# Patient Record
Sex: MALE | Race: ASIAN | NOT HISPANIC OR LATINO | URBAN - METROPOLITAN AREA
[De-identification: names, ages, dates, MRNs, and addresses within clinical notes are randomized per-mention and may not be internally consistent; named-entity substitution may affect disease eponyms.]

---

## 2020-09-05 ENCOUNTER — EMERGENCY (EMERGENCY)
Facility: HOSPITAL | Age: 25
LOS: 1 days | Discharge: HOME | End: 2020-09-05
Attending: EMERGENCY MEDICINE | Admitting: EMERGENCY MEDICINE
Payer: COMMERCIAL

## 2020-09-05 VITALS
SYSTOLIC BLOOD PRESSURE: 145 MMHG | DIASTOLIC BLOOD PRESSURE: 83 MMHG | HEART RATE: 74 BPM | OXYGEN SATURATION: 100 % | RESPIRATION RATE: 16 BRPM

## 2020-09-05 VITALS
DIASTOLIC BLOOD PRESSURE: 96 MMHG | HEART RATE: 63 BPM | SYSTOLIC BLOOD PRESSURE: 139 MMHG | RESPIRATION RATE: 63 BRPM | TEMPERATURE: 98 F | OXYGEN SATURATION: 100 %

## 2020-09-05 LAB
ALBUMIN SERPL ELPH-MCNC: 5.1 G/DL — HIGH (ref 3.3–5)
ALP SERPL-CCNC: 53 U/L — SIGNIFICANT CHANGE UP (ref 40–120)
ALT FLD-CCNC: 26 U/L — SIGNIFICANT CHANGE UP (ref 4–41)
ANION GAP SERPL CALC-SCNC: 17 MMO/L — HIGH (ref 7–14)
AST SERPL-CCNC: 19 U/L — SIGNIFICANT CHANGE UP (ref 4–40)
BASOPHILS # BLD AUTO: 0.05 K/UL — SIGNIFICANT CHANGE UP (ref 0–0.2)
BASOPHILS NFR BLD AUTO: 0.8 % — SIGNIFICANT CHANGE UP (ref 0–2)
BILIRUB SERPL-MCNC: 0.4 MG/DL — SIGNIFICANT CHANGE UP (ref 0.2–1.2)
BUN SERPL-MCNC: 8 MG/DL — SIGNIFICANT CHANGE UP (ref 7–23)
CALCIUM SERPL-MCNC: 10.3 MG/DL — SIGNIFICANT CHANGE UP (ref 8.4–10.5)
CHLORIDE SERPL-SCNC: 103 MMOL/L — SIGNIFICANT CHANGE UP (ref 98–107)
CO2 SERPL-SCNC: 24 MMOL/L — SIGNIFICANT CHANGE UP (ref 22–31)
CREAT SERPL-MCNC: 0.66 MG/DL — SIGNIFICANT CHANGE UP (ref 0.5–1.3)
EOSINOPHIL # BLD AUTO: 0.16 K/UL — SIGNIFICANT CHANGE UP (ref 0–0.5)
EOSINOPHIL NFR BLD AUTO: 2.7 % — SIGNIFICANT CHANGE UP (ref 0–6)
GLUCOSE SERPL-MCNC: 101 MG/DL — HIGH (ref 70–99)
HCT VFR BLD CALC: 48.5 % — SIGNIFICANT CHANGE UP (ref 39–50)
HGB BLD-MCNC: 16.1 G/DL — SIGNIFICANT CHANGE UP (ref 13–17)
IMM GRANULOCYTES NFR BLD AUTO: 0.3 % — SIGNIFICANT CHANGE UP (ref 0–1.5)
LYMPHOCYTES # BLD AUTO: 2.69 K/UL — SIGNIFICANT CHANGE UP (ref 1–3.3)
LYMPHOCYTES # BLD AUTO: 45.7 % — HIGH (ref 13–44)
MAGNESIUM SERPL-MCNC: 2.1 MG/DL — SIGNIFICANT CHANGE UP (ref 1.6–2.6)
MCHC RBC-ENTMCNC: 28 PG — SIGNIFICANT CHANGE UP (ref 27–34)
MCHC RBC-ENTMCNC: 33.2 % — SIGNIFICANT CHANGE UP (ref 32–36)
MCV RBC AUTO: 84.2 FL — SIGNIFICANT CHANGE UP (ref 80–100)
MONOCYTES # BLD AUTO: 0.44 K/UL — SIGNIFICANT CHANGE UP (ref 0–0.9)
MONOCYTES NFR BLD AUTO: 7.5 % — SIGNIFICANT CHANGE UP (ref 2–14)
NEUTROPHILS # BLD AUTO: 2.53 K/UL — SIGNIFICANT CHANGE UP (ref 1.8–7.4)
NEUTROPHILS NFR BLD AUTO: 43 % — SIGNIFICANT CHANGE UP (ref 43–77)
NRBC # FLD: 0 K/UL — SIGNIFICANT CHANGE UP (ref 0–0)
PHOSPHATE SERPL-MCNC: 3.8 MG/DL — SIGNIFICANT CHANGE UP (ref 2.5–4.5)
PLATELET # BLD AUTO: 174 K/UL — SIGNIFICANT CHANGE UP (ref 150–400)
PMV BLD: 9.2 FL — SIGNIFICANT CHANGE UP (ref 7–13)
POTASSIUM SERPL-MCNC: 4.3 MMOL/L — SIGNIFICANT CHANGE UP (ref 3.5–5.3)
POTASSIUM SERPL-SCNC: 4.3 MMOL/L — SIGNIFICANT CHANGE UP (ref 3.5–5.3)
PROT SERPL-MCNC: 7.8 G/DL — SIGNIFICANT CHANGE UP (ref 6–8.3)
RBC # BLD: 5.76 M/UL — SIGNIFICANT CHANGE UP (ref 4.2–5.8)
RBC # FLD: 13.1 % — SIGNIFICANT CHANGE UP (ref 10.3–14.5)
SODIUM SERPL-SCNC: 144 MMOL/L — SIGNIFICANT CHANGE UP (ref 135–145)
TSH SERPL-MCNC: 5.5 UIU/ML — HIGH (ref 0.27–4.2)
WBC # BLD: 5.89 K/UL — SIGNIFICANT CHANGE UP (ref 3.8–10.5)
WBC # FLD AUTO: 5.89 K/UL — SIGNIFICANT CHANGE UP (ref 3.8–10.5)

## 2020-09-05 PROCEDURE — 71046 X-RAY EXAM CHEST 2 VIEWS: CPT | Mod: 26

## 2020-09-05 PROCEDURE — 99284 EMERGENCY DEPT VISIT MOD MDM: CPT | Mod: 25

## 2020-09-05 PROCEDURE — 93010 ELECTROCARDIOGRAM REPORT: CPT

## 2020-09-05 RX ORDER — FAMOTIDINE 10 MG/ML
20 INJECTION INTRAVENOUS ONCE
Refills: 0 | Status: COMPLETED | OUTPATIENT
Start: 2020-09-05 | End: 2020-09-05

## 2020-09-05 RX ADMIN — FAMOTIDINE 20 MILLIGRAM(S): 10 INJECTION INTRAVENOUS at 03:24

## 2020-09-05 RX ADMIN — Medication 30 MILLILITER(S): at 03:23

## 2020-09-05 NOTE — ED PROVIDER NOTE - OBJECTIVE STATEMENT
Manny Carter MD: 25 yo M PMH p/w palpitation x 2 weeks. Pt states that he feels like something is moving in his chest. Pt also states that when he lays down he can hear his pulse. Pt also report numbness in his legs b/l. Pt reports that he feels nerves and that his symptoms started after he heard his grandfather had a heart attack 2 weeks ago. pt also report difficulty falling asleep but reports getting 7 hrs of sleep a night. Pt states that he went to urgent care yesterday and had labs and cxr done which where normal.

## 2020-09-05 NOTE — ED PROVIDER NOTE - NSFOLLOWUPINSTRUCTIONS_ED_ALL_ED_FT
1) Please follow-up with your primary care doctor in the next 2-3 days.  Please call tomorrow for an appointment.  If you cannot follow-up with your primary care doctor please return to the ED for any urgent issues. Your Thyroid stimulating hormone was elevated Please follow up with your PCP for further testing  2) You were given a copy of the tests performed today.  Please bring the results with you and review them with your primary care doctor.  3) If you have any worsening of symptoms or any other concerns please return to the ED immediately. Such as but not limited to including chest pain, shortness or breath, or weakness.  4) Please continue taking your home medications as directed.

## 2020-09-05 NOTE — ED ADULT NURSE NOTE - OBJECTIVE STATEMENT
Break Coverage RN: Received pt in room 7, ambulatory, pt A&Ox4, respirations even and unlabored b/l. Pt c/o "sensation that something is going down my chest", also c/o palpitations, slight SOB, head throbbing/numbness, tingling in b/l legs for 2 weeks. Denies pmhx. Abdomen soft, nondistended, nontender. Appears in no apparent distress. Sinus rhythm on cardiac monitor. Awaiting MD estrada. Will continue to monitor.

## 2020-09-05 NOTE — ED PROVIDER NOTE - ATTENDING CONTRIBUTION TO CARE
25yo M with no smoking, drug use, or PMHX, p/w 2 weeks of palpitations, feeling anxious, sensation there is something inside chest moving around, pulsations in head, numbness intermittently in legs and feet.  Says symptoms are often worse at night and began after grandfather had heart attack and he has been worried and thinking a lot lately    General: Patient in no apparent distress, AAO x 3  Skin: Dry and intact  HEENT: Head atraumatic. Oral mucosa moist. No pharyngeal exudates or tonsillar enlargement  Eyes: Conjunctiva normal  Cardiac: Regular rhythm and rate. No pretibial edema b/l  Respiratory: Lungs clear b/l and symmetric. No respiratory distress. Able to speak in complete sentences.  Gastrointestinal: Abdomen soft, nondistended, nontender  Musculoskeletal: Moves all extremities spontaneously  Neurological: alert and oriented to person, place, and time  Psychiatric: Cooperative, anxious appearing    EKG nsr on acute ischemic changes    a/p  multiple symptoms  anxious vs thyroid dz, perhaps gerd/pud as chest symptoms occur mostly at night  labs with tsh, cxr, gi meds

## 2020-09-05 NOTE — ED ADULT TRIAGE NOTE - CHIEF COMPLAINT QUOTE
pt c/o palpitations x 2 weeks. also endorses headache and 2 days of numbness to legs. nofevers, chills, or cough. denies medical hx

## 2020-09-05 NOTE — ED PROVIDER NOTE - PROGRESS NOTE DETAILS
Manny Carter MD: TSH mild elevated. Inform pt and he will f/u with pcp. Pt well appearing and asymptomatic. Pt is ambulatory and tolerating PO. Spoke with pt about return precautions. Pt agrees to follow up with their PCP. Pt ready for discharge

## 2020-09-05 NOTE — ED ADULT NURSE NOTE - NSIMPLEMENTINTERV_GEN_ALL_ED
Implemented All Universal Safety Interventions:  [place] to call system. Call bell, personal items and telephone within reach. Instruct patient to call for assistance. Room bathroom lighting operational. Non-slip footwear when patient is off stretcher. Physically safe environment: no spills, clutter or unnecessary equipment. Stretcher in lowest position, wheels locked, appropriate side rails in place.

## 2020-09-05 NOTE — ED PROVIDER NOTE - PHYSICAL EXAMINATION
Gen: AAOx3, non-toxic  Head: NCAT  HEENT: EOMI, PERRLA, oral mucosa moist, normal conjunctiva  Lung: CTAB, no respiratory distress, no wheezes/rhonchi/rales B/L, speaking in full sentences  CV: RRR, no murmurs, rubs or gallops  Abd: soft, NTND, no guarding, no CVA tenderness, no rebound tenderness  MSK: no visible deformities, full range of motion of all 4 exts  Neuro: No focal sensory or motor deficits  Skin: Warm, well perfused, no rash  Psych: normal affect.   ~Manny Carter MD Gen: AAOx3, non-toxic  Head: NCAT  HEENT: EOMI, PERRLA, oral mucosa moist, normal conjunctiva  Lung: CTAB, no respiratory distress, no wheezes/rhonchi/rales B/L, speaking in full sentences  CV: RRR, no murmurs, rubs or gallops  Abd: soft, NTND, no guarding, no CVA tenderness, no rebound tenderness  MSK: no visible deformities, full range of motion of all 4 exts  Neuro: No focal sensory or motor deficits  Skin: Warm, well perfused, no rash  Psych: anxious.   ~Manny Carter MD

## 2020-09-05 NOTE — ED PROVIDER NOTE - NS ED ROS FT
GENERAL: No fever or chills, EYES: no change in vision, HEENT: no trouble speaking, CARDIAC: no chest pain, + palpitation PULMONARY: no cough or + SOB, GI: no abdominal pain, no nausea, no vomiting, no diarrhea or constipation, : No changes in urination, SKIN: no rashes, NEURO: +numbness, no headache,  MSK: No muscle pain ~Manny Carter MD

## 2020-09-05 NOTE — ED PROVIDER NOTE - CLINICAL SUMMARY MEDICAL DECISION MAKING FREE TEXT BOX
anxiety Manny Carter MD: 23 yo M PMH p/w palpitation x 2 weeks. Pt states that he feels like something is moving in his chest. DDX. anxiety/panic disorder vs GERD will get basic labs, tsh ekg, cxr

## 2020-09-05 NOTE — ED PROVIDER NOTE - PATIENT PORTAL LINK FT
You can access the FollowMyHealth Patient Portal offered by [facility] by registering at the following website: [email or web address]. By joining [employer]’s FollowMyHealth portal, you will also be able to view your health information using other applications (apps) compatible with our system.

## 2020-09-25 PROBLEM — Z00.00 ENCOUNTER FOR PREVENTIVE HEALTH EXAMINATION: Status: ACTIVE | Noted: 2020-09-25

## 2020-09-26 ENCOUNTER — EMERGENCY (EMERGENCY)
Facility: HOSPITAL | Age: 25
LOS: 1 days | Discharge: HOME | End: 2020-09-26
Attending: STUDENT IN AN ORGANIZED HEALTH CARE EDUCATION/TRAINING PROGRAM | Admitting: STUDENT IN AN ORGANIZED HEALTH CARE EDUCATION/TRAINING PROGRAM
Payer: COMMERCIAL

## 2020-09-26 VITALS
SYSTOLIC BLOOD PRESSURE: 133 MMHG | OXYGEN SATURATION: 100 % | RESPIRATION RATE: 16 BRPM | HEART RATE: 78 BPM | DIASTOLIC BLOOD PRESSURE: 88 MMHG | TEMPERATURE: 98 F

## 2020-09-26 PROCEDURE — 99282 EMERGENCY DEPT VISIT SF MDM: CPT

## 2020-09-26 RX ORDER — IBUPROFEN 200 MG
400 TABLET ORAL ONCE
Refills: 0 | Status: COMPLETED | OUTPATIENT
Start: 2020-09-26 | End: 2020-09-26

## 2020-09-26 RX ADMIN — Medication 400 MILLIGRAM(S): at 15:58

## 2020-09-26 NOTE — ED PROVIDER NOTE - PMH
No pertinent past medical history   <<----- Click to add NO pertinent Past Medical History Deviated septum

## 2020-09-26 NOTE — ED ADULT TRIAGE NOTE - CHIEF COMPLAINT QUOTE
pain to nose radiating to head ,r eye x 4 days.saw eye md yesterday with negative eye exam. pt reports symptoms become worse at night. denies fever, cough

## 2020-09-26 NOTE — ED PROVIDER NOTE - CLINICAL SUMMARY MEDICAL DECISION MAKING FREE TEXT BOX
25 M no PMH p/w 4 days of frontal headache and eye pain. worst with laying flat. He has taken flonase and cetrizine w/o much improvement. He states he has hx of allergies and sinus issues for which he was recommended having a procedure few year before. he denies syncope, numbness, weakness, abdominal pain. he has normal neuro exam. + tenderness over b/l frontal sinuses.   Pt recommended to take flonase BID for 7 days along with motrin 600mg q 8 hor 5 days. Follow up with ent for further evaluation 25 M no PMH p/w 4 days of frontal headache and eye pain. worst with laying flat. He has taken flonase and cetrizine w/o much improvement. He states he has hx of allergies and sinus issues for which he was recommended having a procedure few year before. he denies syncope, numbness, weakness, abdominal pain. he has normal neuro exam. + tenderness over b/l frontal sinuses.   Pt recommended to take flonase two sprays per nostril BID for 7 days along with motrin 600mg q 8 hor 5 days.  Nasal saline flushes. Follow up with ent for further evaluation

## 2020-09-26 NOTE — ED PROVIDER NOTE - PHYSICAL EXAMINATION
CONSTITUTIONAL: well-appearing, in NAD  SKIN: Warm dry, normal skin turgor  HEAD: NCAT  EYES: EOMI, PERRLA, no scleral icterus, conjunctiva pink  ENT: normal pharynx with no erythema or exudates. Appreciable nasal septal deviation to right, no visible erythema, edema, no nasal discharge/bleeding.    NECK: Supple; non tender. Full ROM. No lymphadenopathy.  CARD: RRR, no murmurs.  RESP: clear to ausculation b/l. No crackles or wheezing.  ABD: soft, non-tender, non-distended, no rebound or guarding.  NEURO: normal motor. normal sensory. CN III-XII intact.   PSYCH: Cooperative, anxious, appropriate.

## 2020-09-26 NOTE — ED PROVIDER NOTE - NSFOLLOWUPCLINICS_GEN_ALL_ED_FT
An ENT Doctor  Otolaryngology (ENT)  .  [place]   Phone:   Fax:   Follow Up Time:     [facility] - ENT  Otolaryngology (ENT)  [address] [place] [address]  Phone: [phone]  Fax:   Follow Up Time:

## 2020-09-26 NOTE — ED PROVIDER NOTE - OBJECTIVE STATEMENT
25M no PMH presents to ED for 1 week worsening pressure pain at bridge of nose with radiation to bilateral forehead, pain on face under L eye, as well as difficulty breathing through his nose at night. Pt states that symptoms are worse at night and with lying down, get better with ibuprofen. Pt states he came to ED because he is having trouble sleeping at night with his pain, and feels like he cannot focus at work as a result. Denies prior hx of symptoms, denies fever, chills, denies nasal discharge/bleeding, denies cough, sore throat, denies dizziness, weakness, lightheadedness, double vision. Pt endorses occasional blurred vision but thinks its related to R eye tearing. Pt presented to ED three weeks ago for chest palpitations and was discharged home, pt says current sxs are unrelated

## 2020-09-26 NOTE — ED PROVIDER NOTE - NSFOLLOWUPINSTRUCTIONS_ED_ALL_ED_FT
You were seen in the Emergency Department for headache and nose pain, likely secondary to your chronic septum issue.    Please follow up with an ENT within 7 days. Contact information for our ENT service has been provided below.    Follow up with your primary care physician within 7 days.    For symptomatic relief, please continue to take your allergy medication as follows:  -Flonase, 2 sprays/nostril, twice a day. Please see medication label for use instructions and warnings.  -Continue to take your Zyrtec once/day. Please see medication label for use instructions and warnings.  -For pain, please take 600mg Motrin or Ibuprofen three times/day. Please see medication label for use instructions and warnings.  -Use saline nasal spray as needed for discomfort. Please see medication label for use instructions and warnings.    If your pain worsens, if you develop fever, chills, nasal discharge, cough, shortness of breath, or develop any other symptoms, or if your symptoms last longer than 10 days, return to the Emergency Department.

## 2020-10-12 ENCOUNTER — TRANSCRIPTION ENCOUNTER (OUTPATIENT)
Age: 25
End: 2020-10-12

## 2020-10-13 ENCOUNTER — APPOINTMENT (OUTPATIENT)
Dept: OTOLARYNGOLOGY | Facility: CLINIC | Age: 25
End: 2020-10-13

## 2020-10-19 PROBLEM — J34.2 DEVIATED NASAL SEPTUM: Chronic | Status: ACTIVE | Noted: 2020-09-26

## 2020-11-12 ENCOUNTER — APPOINTMENT (OUTPATIENT)
Dept: OTOLARYNGOLOGY | Facility: CLINIC | Age: 25
End: 2020-11-12
Payer: COMMERCIAL

## 2020-11-12 VITALS
HEIGHT: 69 IN | BODY MASS INDEX: 26.66 KG/M2 | TEMPERATURE: 98 F | SYSTOLIC BLOOD PRESSURE: 134 MMHG | WEIGHT: 180 LBS | HEART RATE: 87 BPM | DIASTOLIC BLOOD PRESSURE: 85 MMHG

## 2020-11-12 PROCEDURE — 31231 NASAL ENDOSCOPY DX: CPT

## 2020-11-12 PROCEDURE — 99204 OFFICE O/P NEW MOD 45 MIN: CPT | Mod: 25

## 2020-11-12 PROCEDURE — 99072 ADDL SUPL MATRL&STAF TM PHE: CPT

## 2020-11-12 PROCEDURE — 95004 PERQ TESTS W/ALRGNC XTRCS: CPT

## 2020-11-12 RX ORDER — CETIRIZINE HYDROCHLORIDE 10 MG/1
10 TABLET, FILM COATED ORAL DAILY
Qty: 90 | Refills: 2 | Status: ACTIVE | COMMUNITY
Start: 2020-11-12 | End: 1900-01-01

## 2020-11-12 RX ORDER — AZELASTINE HYDROCHLORIDE 137 UG/1
0.1 SPRAY, METERED NASAL TWICE DAILY
Qty: 1 | Refills: 3 | Status: ACTIVE | COMMUNITY
Start: 2020-11-12 | End: 1900-01-01

## 2020-11-12 NOTE — ASSESSMENT
[FreeTextEntry1] : Pt with nasal congestion and sinus pressure with severe left septal deviation and bilateral turbinate hypertrophy. Will start regiment to see if may improve symptoms and escalate if needed \par - Will start Flonase. A topical steroid reduce mucosal swelling, illustrated appropriate use and how to reduce the risk of bleeding \par - Nasal irrigation and showed how to use it to maximize effectiveness \par - started on azelastine \par - will also send in some Zyrtec \par - Allergy test performed. Counseled patient at length on pathophysiology all allergies and techniques for avoidance. handouts given.Mold, weed and tree\par - can consider septoplasty with sxs don’t improve, pt would like to try medical management first. \par \par \par Pt also with throat tightness on exam found to have mild acid reflux\par LPRD\par will proceed to start lifestyle regiment to reduce overproduction of acid and reduce laryngeal reflux including avoiding caffein, alcohol, eating before bed, spicy and fatty foods, and head elevation at night etc. Handout detailing regiment also given\par

## 2020-11-12 NOTE — PHYSICAL EXAM
[Midline] : trachea located in midline position [Normal] : no rashes [de-identified] : +poor tip support

## 2020-11-12 NOTE — PROCEDURE
[FreeTextEntry6] : Procedure performed: Nasal Endoscopy- Diagnostic\par Pre-op indication(s): nasal congestion\par Post-op indication(s): nasal congestion \par Verbal and/or written consent obtained from patient\par Anterior rhinoscopy insufficient to account for symptoms\par Scope #: 3,  flexible fiber optic telescope \par The scope was introduced in the nasal passage between the middle and inferior turbinates to exam the inferior portion of the middle meatus and the fontanelle, as well as the maxillary ostia.  Next, the scope was passed medically and posteriorly to the middle turbinates to examine the sphenoethmoid recess and the superior turbinate region.\par Upon visualization the finders are as follows:\par Nasal Septum: left septal deviation\par Bilateral - Mucosa: boggy turbinates, Mucous: scant, Polyp: not seen, Inferior Turbinate: boggy, Middle Turbinate: b/l BITH, Superior Turbinate: normal, Inferior Meatus: narrow, Middle Meatus: narrow, Super Meatus:normal, Sphenoethmoidal Recess: clear\par  [de-identified] : Procedure performed: laryngeal Endoscopy- Diagnostic\par Pre-op/post op indication: throat tightness \par Verbal and/or written consent obtained from patient, Patient was unable to cooperate with mirror\par Scope #: 3, flexible fiber optic telescope used \par Scope was introduced through the nose passed on the floor of the nose to the nasopharynx and then followed down the soft palate to the lower pharynx. The tongue Base, Larynx, Hypopharynx were examined. Base of tongue was symmetric, vallecular was clear, epiglottis was not deformed, subglottis/ pyriform and posterior pharyngeal walls were clear. +mild acid reflux, +erythema, edema, pooling of secretions, masses or lesions. Airway patent, no foreign body visualized. No glottic/supraglottic edema. True vocal cords, arytenoids, vestibular folds, ventricles, pyriform sinuses, and aryepiglottic folds appear normal bilaterally. Vocal cords mobile with good contact b/l.\par \par

## 2020-11-12 NOTE — END OF VISIT
[FreeTextEntry3] : I personally saw and examined BLU ARREDONDO in detail. I spoke to TRAVIS Ratliff regarding the assessment and plan of care.  I preformed the procedures and I reviewed the above assessment and plan of care, and agree. I have made changes in changes in the body of the note where appropriate.\par \par

## 2020-11-12 NOTE — HISTORY OF PRESENT ILLNESS
[de-identified] : Pt with hx of nasal trauma. Pt c/o throat tightness, feels its very hard to sleep at night that started 2 months ago. Feels its better than before. \par worse at night when hes laying down, unable to sleep supine position \par sleeping side will make it better \par will breathe through his mouth since he cant breathe through his nose, which also makes the sxs worse\par +intermittent dry cough for three years \par Pt denies heartburn, dysphagia, hoarseness, not a smoker or drinker\par Previous ENT stated he has a DNS, told him to wait a few months could be due to stress or anxiety. 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

## 2020-12-10 ENCOUNTER — APPOINTMENT (OUTPATIENT)
Dept: OTOLARYNGOLOGY | Facility: CLINIC | Age: 25
End: 2020-12-10

## 2021-01-05 RX ORDER — AMOXICILLIN AND CLAVULANATE POTASSIUM 875; 125 MG/1; MG/1
875-125 TABLET, COATED ORAL
Qty: 20 | Refills: 0 | Status: ACTIVE | COMMUNITY
Start: 2021-01-05 | End: 1900-01-01

## 2021-01-05 RX ORDER — PREDNISONE 10 MG/1
10 TABLET ORAL
Qty: 27 | Refills: 0 | Status: ACTIVE | COMMUNITY
Start: 2021-01-05 | End: 1900-01-01

## 2021-01-14 ENCOUNTER — APPOINTMENT (OUTPATIENT)
Dept: OTOLARYNGOLOGY | Facility: CLINIC | Age: 26
End: 2021-01-14
Payer: COMMERCIAL

## 2021-01-14 VITALS
DIASTOLIC BLOOD PRESSURE: 84 MMHG | SYSTOLIC BLOOD PRESSURE: 134 MMHG | BODY MASS INDEX: 26.66 KG/M2 | WEIGHT: 180 LBS | TEMPERATURE: 98.9 F | HEIGHT: 69 IN | HEART RATE: 101 BPM

## 2021-01-14 DIAGNOSIS — J34.89 OTHER SPECIFIED DISORDERS OF NOSE AND NASAL SINUSES: ICD-10-CM

## 2021-01-14 DIAGNOSIS — R68.89 OTHER GENERAL SYMPTOMS AND SIGNS: ICD-10-CM

## 2021-01-14 DIAGNOSIS — J30.2 OTHER SEASONAL ALLERGIC RHINITIS: ICD-10-CM

## 2021-01-14 DIAGNOSIS — K21.9 GASTRO-ESOPHAGEAL REFLUX DISEASE W/OUT ESOPHAGITIS: ICD-10-CM

## 2021-01-14 DIAGNOSIS — R09.81 NASAL CONGESTION: ICD-10-CM

## 2021-01-14 PROCEDURE — 99072 ADDL SUPL MATRL&STAF TM PHE: CPT

## 2021-01-14 PROCEDURE — 31231 NASAL ENDOSCOPY DX: CPT

## 2021-01-14 PROCEDURE — 99214 OFFICE O/P EST MOD 30 MIN: CPT | Mod: 25

## 2021-01-14 NOTE — END OF VISIT
[FreeTextEntry3] : I personally saw and examined BLU ARREDONDO in detail. I spoke to TRAVIS Ratliff regarding the assessment and plan of care.  I preformed the procedures and I reviewed the above assessment and plan of care, and agree. I have made changes in changes in the body of the note where appropriate.\par \par \par \par

## 2021-01-14 NOTE — HISTORY OF PRESENT ILLNESS
[de-identified] : Pt with hx of nasal trauma. Pt c/o throat tightness, feels its very hard to sleep at night that started 2 months ago. Feels its better than before. \par worse at night when hes laying down, unable to sleep supine position \par sleeping side will make it better \par will breathe through his mouth since he cant breathe through his nose, which also makes the sxs worse\par +intermittent dry cough for three years \par Pt denies heartburn, dysphagia, hoarseness, not a smoker or drinker\par Previous ENT stated he has a DNS, told him to wait a few months could be due to stress or anxiety. 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 \par was given abx and steriods and now states there was some improvement but not fully resolved\par currently taking flonase, and doing the saline washes  \par full optho workup per pt- WNL\par nuero- WNL \par \par pt also with globus sensation last visit had acid reflux on exam, doing the precautions but with no relief.\par also feels this tightness at the back of his tongue. \par +mild dysphagia\par pt denies changes in voice, no difficulty talking or breathing

## 2021-01-14 NOTE — CONSULT LETTER
[Please see my note below.] : Please see my note below. [FreeTextEntry1] : Dear Dr. LENA NIELSEN \par I had the pleasure of evaluating your patient BLU ARREDONDO, thank you for allowing us to participate in their care. please see full note detailing our visit below.\par If you have any questions, please do not hesitate to call me and I would be happy to discuss further. \par \par Lowell Woodson M.D.\par Attending Physician,  \par Department of Otolaryngology - Head and Neck Surgery\par [facility] \par Office: [phone]\par Fax: [phone]\par \par

## 2021-01-14 NOTE — PHYSICAL EXAM
[Midline] : trachea located in midline position [Normal] : no rashes [de-identified] : +poor tip support

## 2021-01-14 NOTE — PROCEDURE
[FreeTextEntry6] : Procedure performed: Nasal Endoscopy- Diagnostic\par Pre-op indication(s): nasal congestion\par Post-op indication(s): nasal congestion \par Verbal and/or written consent obtained from patient\par Anterior rhinoscopy insufficient to account for symptoms\par Scope #: 3,  flexible fiber optic telescope \par The scope was introduced in the nasal passage between the middle and inferior turbinates to exam the inferior portion of the middle meatus and the fontanelle, as well as the maxillary ostia.  Next, the scope was passed medically and posteriorly to the middle turbinates to examine the sphenoethmoid recess and the superior turbinate region.\par Upon visualization the finders are as follows:\par Nasal Septum: left septal deviation\par Bilateral - Mucosa: boggy turbinates, Mucous: scant, Polyp: not seen, Inferior Turbinate: boggy, Middle Turbinate: b/l BITH, Superior Turbinate: normal, Inferior Meatus: narrow, Middle Meatus: narrow, Super Meatus:normal, Sphenoethmoidal Recess: clear\par  [de-identified] : Procedure performed: laryngeal Endoscopy- Diagnostic\par Pre-op/post op indication: throat tightness \par Verbal and/or written consent obtained from patient, Patient was unable to cooperate with mirror\par Scope #: 3, flexible fiber optic telescope used \par Scope was introduced through the nose passed on the floor of the nose to the nasopharynx and then followed down the soft palate to the lower pharynx. The tongue Base, Larynx, Hypopharynx were examined. Base of tongue was symmetric, vallecular was clear, epiglottis was not deformed, subglottis/ pyriform and posterior pharyngeal walls were clear. +mild acid reflux, +erythema, edema, pooling of secretions, masses or lesions. Airway patent, no foreign body visualized. No glottic/supraglottic edema. True vocal cords, arytenoids, vestibular folds, ventricles, pyriform sinuses, and aryepiglottic folds appear normal bilaterally. Vocal cords mobile with good contact b/l.\par \par

## 2021-01-14 NOTE — ASSESSMENT
[FreeTextEntry1] : Pt with nasal congestion and sinus pressure with severe left septal deviation and bilateral turbinate hypertrophy. Will start regiment to see if may improve symptoms and escalate if needed. Pt s/p sinusitis regimen and no relief with medical management. \par - Nasal irrigation and showed how to use it to maximize effectiveness \par - continue azelastine \par - Risks benefits and alternatives of endoscopic sinus surgery with possible image guidance possible septoplasty bilateral inferior turbinate reduction discussed with patient at length. Risks discussed include but were not limited to bleeding, infection, persistent symptoms, scarring, injury to the skull base and brain and CSF leak, injury to orbit, crusting, septal hematoma, septal perforation results in whistling, crusting and bleeding as well as continued nasal obstruction etc. were discussed. also discussed option of office balloon - similar risk profile, will not address septum and turbs and is less invasive with quicker recover however also higher possibility for need for future intervention. They would like to proceed with balloon\par - Risks benefits and alternatives to septoplasty. discussed. risks of bleeding, infection, septal hematoma, injury to the skull base, septal perforation results in whistling, crusting and bleeding as well as continued nasal obstruction were discussed. Patient understood risks and would like to continue with the operation. \par \par \par Pt also with throat tightness on exam found to have mild acid reflux\par LPRD\par will proceed to start lifestyle regiment to reduce overproduction of acid and reduce laryngeal reflux including avoiding caffein, alcohol, eating before bed, spicy and fatty foods, and head elevation at night etc. Handout detailing regiment also given\par started on PPI \par

## 2021-04-08 ENCOUNTER — RX RENEWAL (OUTPATIENT)
Age: 26
End: 2021-04-08

## 2021-04-08 RX ORDER — ESOMEPRAZOLE MAGNESIUM 20 MG/1
20 CAPSULE, DELAYED RELEASE ORAL DAILY
Qty: 90 | Refills: 0 | Status: ACTIVE | COMMUNITY
Start: 2021-01-14 | End: 1900-01-01

## 2023-03-06 NOTE — ED PROVIDER NOTE - PATIENT PORTAL LINK FT
Abdomen soft, non-tender and non-distended, no rebound, no guarding and no masses. no hepatosplenomegaly.
You can access the FollowMyHealth Patient Portal offered by [facility] by registering at the following website: [email or web address]. By joining [employer]’s FollowMyHealth portal, you will also be able to view your health information using other applications (apps) compatible with our system.

## 2023-11-30 ENCOUNTER — NON-APPOINTMENT (OUTPATIENT)
Age: 28
End: 2023-11-30

## 2024-01-25 ENCOUNTER — NON-APPOINTMENT (OUTPATIENT)
Age: 29
End: 2024-01-25

## 2024-06-11 NOTE — ED ADULT NURSE NOTE - IN THE PAST 12 MONTHS HAVE YOU USED DRUGS OTHER THAN THOSE REQUIRED FOR MEDICAL REASON?
Occupational Therapy Evaluation    Patient Name: Heidi Evans  MRN: [id number]  Today's Date: 6/11/2024  Time Calculation  Start Time: 0845  Stop Time: 0920  Time Calculation (min): 35 min    Onset 3/1/24  Insurance  Visit 1 of [place]  Authorization: not required  Insurance plan: [employer]  Medicare certification: 6/11/24 - 9/3/24    Assessment: Heidi Evans is a 76 yo retiree with mild L rotator cuff impingement. She reports mild intermittent pain about her L upper arm with movements involving shoulder abduction, and mild intermittent pain about her L lateral forearm/wrist. She demonstrates full L shoulder motion except for abduction. Her L shoulder and elbow are weaker than her contralateral arm. Her pain, limited motion, and weakness, while mild, do make some I/ADL difficult. I got her started today on a home program that addresses these issues. She will benefit from ongoing occupational therapy to get her back to safe and pain-free I/ADL performance and her prior level of function.     Plan: therapeutic exercise, therapeutic activity, self-care, home management, manual therapy, neuromuscular coordination, vasopneumatic, ultrasound, kinesiotaping  Goals  In 6-8 weeks, Heidi will achieve the following goals  She will be able to perform self-care, household, work, and leisure tasks with lower pain (0-1/10), 75% of the time.    She will attain 5/5 left shoulder strength in order to fully perform self-care, household, work, and leisure tasks.    She will attain 5/5 left elbow/forearm strength in order to fully perform self-care, household, work and or leisure tasks.     Patient's goals for therapy: Get rid of the L shoulder pain because she had no pain before    Plan of care was developed with input and agreement by the patient  Frequency: 1 x Week  Duration: 6 Weeks    Subjective   Was moving her Mom in March when the pain started. Her pain has gotten better, hasn't let her stop her. Works in her yard, keeps  "busy.    Pain  Soreness with certain movements like donning/doffing jacket 1-2/10   Also feels \"something\" in her L lateral forearm to wrist, also intermittent    Objective   Outcome Measure: QuickDASH: 0    Edema: none    Sensation  WNL    Neuro exam: TBE  Radial nerve: 5/5 strength in thumb extension, sensation intact to dorsal  surface of thumb/index web space  Median nerve: 5/5 strength in thumb abduction and opposition, sensation  intact to palmar surface of index finger  Ulnar nerve: 5/5 strength in thumb/little finger approximation, sensation  intact to palmar surface of little finger     AROM  Shoulder: degrees  flexion 124 bilat  extension 58 bilat   abduction L 135, R 158  external rotation wnl bilat  internal rotation L T8 with effort, R T8 w/o effort    L elbow/wrist/forearm/hand WNL     Strength LUE  Shoulder:   abduction 4+/5  external rotation with shoulder at side 4/5  internal rotation with shoulder at side 5/5  flexion 4+/5  extension 4+/5    Elbow:  flexion 4+/5  extension 4+/5  supination 4/5  pronation 4/5    Wrist: TBE  flexion /5  extension /5  radial deviation /5  ulnar deviation /5    Hand: TBE   strength (pos 2):     L , R     Special Tests  Shoulder: From Dr. Black's note of 5/28/24: Impingement signs are positive with Neers test, Garcia and crossover. Instability tests are negative with anterior and posterior drawer, sulcus, and apprehension with relocation test. Speeds test is negative. Lynn's test is negative.     Wrist: Finkelstein's Test: L mildly positive, R negative    Treatment  Education: home exercise program, plan of care, activity modification, pain management, and pertinent anatomy     Therapeutic Exercise:  Instructed Heidi in her home program: active scapular motion (elevation, depression, retraction, combined downward rotation and retraction), L shoulder strengthening in flexion, extension, abduction, adduction, internal rotation, and external rotation with red " theraband (issued), x 2 sets of 10, 2x/day.      OT Evaluation Time Entry  OT Evaluation (Low) Time Entry: 20  OT Therapeutic Procedures Time Entry  Therapeutic Exercise Time Entry: 15             No

## 2024-10-03 ENCOUNTER — EMERGENCY (EMERGENCY)
Facility: HOSPITAL | Age: 29
LOS: 1 days | Discharge: HOME | End: 2024-10-03
Admitting: STUDENT IN AN ORGANIZED HEALTH CARE EDUCATION/TRAINING PROGRAM
Payer: COMMERCIAL

## 2024-10-03 VITALS
OXYGEN SATURATION: 100 % | RESPIRATION RATE: 18 BRPM | DIASTOLIC BLOOD PRESSURE: 78 MMHG | HEART RATE: 87 BPM | TEMPERATURE: 98 F | SYSTOLIC BLOOD PRESSURE: 120 MMHG

## 2024-10-03 VITALS
TEMPERATURE: 98 F | HEART RATE: 99 BPM | OXYGEN SATURATION: 100 % | WEIGHT: 190.04 LBS | RESPIRATION RATE: 16 BRPM | SYSTOLIC BLOOD PRESSURE: 142 MMHG | DIASTOLIC BLOOD PRESSURE: 83 MMHG

## 2024-10-03 LAB
ALBUMIN SERPL ELPH-MCNC: 4.7 G/DL — SIGNIFICANT CHANGE UP (ref 3.3–5)
ALP SERPL-CCNC: 48 U/L — SIGNIFICANT CHANGE UP (ref 40–120)
ALT FLD-CCNC: 16 U/L — SIGNIFICANT CHANGE UP (ref 4–41)
ANION GAP SERPL CALC-SCNC: 15 MMOL/L — HIGH (ref 7–14)
AST SERPL-CCNC: 17 U/L — SIGNIFICANT CHANGE UP (ref 4–40)
BASOPHILS # BLD AUTO: 0.03 K/UL — SIGNIFICANT CHANGE UP (ref 0–0.2)
BASOPHILS NFR BLD AUTO: 0.5 % — SIGNIFICANT CHANGE UP (ref 0–2)
BILIRUB SERPL-MCNC: 0.5 MG/DL — SIGNIFICANT CHANGE UP (ref 0.2–1.2)
BUN SERPL-MCNC: 8 MG/DL — SIGNIFICANT CHANGE UP (ref 7–23)
CALCIUM SERPL-MCNC: 9.9 MG/DL — SIGNIFICANT CHANGE UP (ref 8.4–10.5)
CHLORIDE SERPL-SCNC: 104 MMOL/L — SIGNIFICANT CHANGE UP (ref 98–107)
CO2 SERPL-SCNC: 23 MMOL/L — SIGNIFICANT CHANGE UP (ref 22–31)
CREAT SERPL-MCNC: 0.8 MG/DL — SIGNIFICANT CHANGE UP (ref 0.5–1.3)
EGFR: 122 ML/MIN/1.73M2 — SIGNIFICANT CHANGE UP
EOSINOPHIL # BLD AUTO: 0.02 K/UL — SIGNIFICANT CHANGE UP (ref 0–0.5)
EOSINOPHIL NFR BLD AUTO: 0.3 % — SIGNIFICANT CHANGE UP (ref 0–6)
GLUCOSE SERPL-MCNC: 106 MG/DL — HIGH (ref 70–99)
HCT VFR BLD CALC: 47.1 % — SIGNIFICANT CHANGE UP (ref 39–50)
HGB BLD-MCNC: 15.8 G/DL — SIGNIFICANT CHANGE UP (ref 13–17)
IANC: 3.74 K/UL — SIGNIFICANT CHANGE UP (ref 1.8–7.4)
IMM GRANULOCYTES NFR BLD AUTO: 0.3 % — SIGNIFICANT CHANGE UP (ref 0–0.9)
LYMPHOCYTES # BLD AUTO: 1.72 K/UL — SIGNIFICANT CHANGE UP (ref 1–3.3)
LYMPHOCYTES # BLD AUTO: 29.3 % — SIGNIFICANT CHANGE UP (ref 13–44)
MCHC RBC-ENTMCNC: 28 PG — SIGNIFICANT CHANGE UP (ref 27–34)
MCHC RBC-ENTMCNC: 33.5 GM/DL — SIGNIFICANT CHANGE UP (ref 32–36)
MCV RBC AUTO: 83.5 FL — SIGNIFICANT CHANGE UP (ref 80–100)
MONOCYTES # BLD AUTO: 0.35 K/UL — SIGNIFICANT CHANGE UP (ref 0–0.9)
MONOCYTES NFR BLD AUTO: 6 % — SIGNIFICANT CHANGE UP (ref 2–14)
NEUTROPHILS # BLD AUTO: 3.74 K/UL — SIGNIFICANT CHANGE UP (ref 1.8–7.4)
NEUTROPHILS NFR BLD AUTO: 63.6 % — SIGNIFICANT CHANGE UP (ref 43–77)
NRBC # BLD: 0 /100 WBCS — SIGNIFICANT CHANGE UP (ref 0–0)
NRBC # FLD: 0 K/UL — SIGNIFICANT CHANGE UP (ref 0–0)
OB PNL STL: NEGATIVE — SIGNIFICANT CHANGE UP
PLATELET # BLD AUTO: 189 K/UL — SIGNIFICANT CHANGE UP (ref 150–400)
POTASSIUM SERPL-MCNC: 4.5 MMOL/L — SIGNIFICANT CHANGE UP (ref 3.5–5.3)
POTASSIUM SERPL-SCNC: 4.5 MMOL/L — SIGNIFICANT CHANGE UP (ref 3.5–5.3)
PROT SERPL-MCNC: 7.6 G/DL — SIGNIFICANT CHANGE UP (ref 6–8.3)
RBC # BLD: 5.64 M/UL — SIGNIFICANT CHANGE UP (ref 4.2–5.8)
RBC # FLD: 13 % — SIGNIFICANT CHANGE UP (ref 10.3–14.5)
SODIUM SERPL-SCNC: 142 MMOL/L — SIGNIFICANT CHANGE UP (ref 135–145)
WBC # BLD: 5.88 K/UL — SIGNIFICANT CHANGE UP (ref 3.8–10.5)
WBC # FLD AUTO: 5.88 K/UL — SIGNIFICANT CHANGE UP (ref 3.8–10.5)

## 2024-10-03 PROCEDURE — 99284 EMERGENCY DEPT VISIT MOD MDM: CPT

## 2024-10-03 RX ORDER — KETOROLAC TROMETHAMINE 10 MG/1
15 TABLET, FILM COATED ORAL ONCE
Refills: 0 | Status: DISCONTINUED | OUTPATIENT
Start: 2024-10-03 | End: 2024-10-03

## 2024-10-03 RX ADMIN — KETOROLAC TROMETHAMINE 15 MILLIGRAM(S): 10 TABLET, FILM COATED ORAL at 22:19

## 2024-10-03 NOTE — ED PROVIDER NOTE - PHYSICAL EXAMINATION
Rectal: TRAVIS Iniguez chaperoned by RAIZA Martin. Rectal: TRAVIS Iniguez chaperoned by RAIZA Martin. No palpable  hemorrhoids or other masses, scant brown stool in the vault.

## 2024-10-03 NOTE — ED PROVIDER NOTE - PROGRESS NOTE DETAILS
TRAVIS Iniguez: Pt notes improved pain after Toradol, engaged in a shared decision making discussion, pt is deferring CT imaging, had a CT scan with [facility] in May, Will D/C with GI follow up, return precautions.

## 2024-10-03 NOTE — ED PROVIDER NOTE - CPE EDP RESP NORM
Non-recurrent bilateral inguinal hernia without obstruction or gangrene  08/04/2017    Active  Laith Leon normal...

## 2024-10-03 NOTE — ED PROVIDER NOTE - OBJECTIVE STATEMENT
30 Y/O M PMH Hemorrhoids states he has had 3 months of lower abdominal pain for which he has had a CT scan with his PCP with [facility]: no acute findings noted on the study. Pt states he has still been having the same pain as well as  blood both on the toilet paper and stool when having bowel movements. Pt states he last had a BM at 330 PM today which he states was brown but with blood both on the toilet paper and in the bowl which he states was bright red. Pt denies dark stool, denies any other sx or acute complaints.

## 2024-10-03 NOTE — ED PROVIDER NOTE - NSFOLLOWUPINSTRUCTIONS_ED_ALL_ED_FT
Follow up with a primary doctor and Gastroenterologist, refer to the attached list or you can see Dr. Hussein Morse  [address] [place] [address].  Advance activity as tolerated.  Continue all previously prescribed medications as directed.  Follow up with your primary care physician in 48-72 hours- bring copies of your results.  Return to the ER for worsening or persistent symptoms, and/or ANY NEW OR CONCERNING SYMPTOMS. THIS INCLUDES BUT IS NOT LIMITED TO FEVER, CHILLS, NIGHTSWEATS, INCREASING OR PERSISTENT BLEEDING, LIGHTHEADEDNESS OR FOR ANY OTHER SYMPTOMS THAT CONCERN YOU. If you have issues obtaining follow up, please call: [phone] to obtain a doctor or specialist who takes your insurance in your area.  You may call [phone] to make an appointment with the internal medicine clinic.

## 2024-10-03 NOTE — ED PROVIDER NOTE - CLINICAL SUMMARY MEDICAL DECISION MAKING FREE TEXT BOX
28 Y/O M PMH Hemorrhoids states he has had 3 months of lower abdominal pain for which he has [facility] CT [facility]: no acute findings noted on the study. Pt states he has still been having the same pain as well as  blood both on the toilet paper and stool when having bowel movements. Pt states he last had a BM at 330 PM today which he states was brown but with blood both on the toilet paper and in the bowl which he states was bright red. Engaged in a sheared decision making discussion regarding CT imaging which the pt ultimately declines. Labs ordered to eval for anemia or electrolyte disturbance, occult ordered to eval for active bleeding, toradol ordered for pain, pt endorses improved pain after Toradol administration. Pt was ultimately discharged with Gastroenterology follow up, close return precautions.

## 2024-10-03 NOTE — ED PROVIDER NOTE - NSPTACCESSSVCSAPPTDETAILS_ED_ALL_ED_FT
Urgent Gastroenterology follow up for the next available appointment for abdominal pain, hematochezia.

## 2024-10-03 NOTE — ED PROVIDER NOTE - PATIENT PORTAL LINK FT
You can access the FollowMyHealth Patient Portal offered by [facility] by registering at the following website: [email or web address]. By joining [employer]’s FollowMyHealth portal, you will also be able to view your health information using other applications (apps) compatible with our system.

## 2024-10-03 NOTE — ED ADULT TRIAGE NOTE - CHIEF COMPLAINT QUOTE
pt c/o lower abd pain and rectal bleeding x 3 months. pt was seen by his PMD had CT scans done with no findings and was recommended to have colonoscopy. Pt states bleeding is bright red no clots. No complaints of chest pain, headache, nausea, dizziness, vomiting  SOB, fever, chills verbalized..

## 2024-12-21 ENCOUNTER — EMERGENCY (EMERGENCY)
Facility: HOSPITAL | Age: 29
LOS: 1 days | Discharge: HOME | End: 2024-12-21
Attending: STUDENT IN AN ORGANIZED HEALTH CARE EDUCATION/TRAINING PROGRAM | Admitting: STUDENT IN AN ORGANIZED HEALTH CARE EDUCATION/TRAINING PROGRAM
Payer: COMMERCIAL

## 2024-12-21 VITALS
OXYGEN SATURATION: 98 % | HEIGHT: 69 IN | HEART RATE: 94 BPM | DIASTOLIC BLOOD PRESSURE: 80 MMHG | WEIGHT: 195.11 LBS | RESPIRATION RATE: 18 BRPM | TEMPERATURE: 98 F | SYSTOLIC BLOOD PRESSURE: 146 MMHG

## 2024-12-21 PROCEDURE — 99284 EMERGENCY DEPT VISIT MOD MDM: CPT

## 2024-12-21 PROCEDURE — 71046 X-RAY EXAM CHEST 2 VIEWS: CPT | Mod: 26

## 2024-12-21 PROCEDURE — 93010 ELECTROCARDIOGRAM REPORT: CPT

## 2024-12-21 RX ORDER — SIMETHICONE 125 MG
80 CAPSULE ORAL ONCE
Refills: 0 | Status: COMPLETED | OUTPATIENT
Start: 2024-12-21 | End: 2024-12-21

## 2024-12-21 RX ORDER — MECLIZINE HCL 12.5 MG
25 TABLET ORAL ONCE
Refills: 0 | Status: COMPLETED | OUTPATIENT
Start: 2024-12-21 | End: 2024-12-21

## 2024-12-21 RX ORDER — ONDANSETRON HYDROCHLORIDE 4 MG/1
1 TABLET, FILM COATED ORAL
Qty: 15 | Refills: 0
Start: 2024-12-21 | End: 2024-12-25

## 2024-12-21 RX ORDER — FAMOTIDINE 20 MG/1
20 TABLET, FILM COATED ORAL ONCE
Refills: 0 | Status: COMPLETED | OUTPATIENT
Start: 2024-12-21 | End: 2024-12-21

## 2024-12-21 RX ORDER — MECLIZINE HCL 12.5 MG
1 TABLET ORAL
Qty: 12 | Refills: 0
Start: 2024-12-21 | End: 2024-12-24

## 2024-12-21 RX ORDER — PANTOPRAZOLE SODIUM 40 MG/1
1 TABLET, DELAYED RELEASE ORAL
Qty: 14 | Refills: 0
Start: 2024-12-21 | End: 2025-01-03

## 2024-12-21 RX ORDER — LIDOCAINE 40 MG/G
20 CREAM TOPICAL ONCE
Refills: 0 | Status: COMPLETED | OUTPATIENT
Start: 2024-12-21 | End: 2024-12-21

## 2024-12-21 RX ORDER — MAGNESIUM, ALUMINUM HYDROXIDE 200-225/5
30 SUSPENSION, ORAL (FINAL DOSE FORM) ORAL ONCE
Refills: 0 | Status: COMPLETED | OUTPATIENT
Start: 2024-12-21 | End: 2024-12-21

## 2024-12-21 RX ORDER — ONDANSETRON HYDROCHLORIDE 4 MG/1
4 TABLET, FILM COATED ORAL ONCE
Refills: 0 | Status: COMPLETED | OUTPATIENT
Start: 2024-12-21 | End: 2024-12-21

## 2024-12-21 RX ADMIN — Medication 25 MILLIGRAM(S): at 23:32

## 2024-12-21 RX ADMIN — Medication 30 MILLILITER(S): at 22:39

## 2024-12-21 RX ADMIN — ONDANSETRON HYDROCHLORIDE 4 MILLIGRAM(S): 4 TABLET, FILM COATED ORAL at 22:39

## 2024-12-21 RX ADMIN — Medication 80 MILLIGRAM(S): at 22:39

## 2024-12-21 RX ADMIN — FAMOTIDINE 20 MILLIGRAM(S): 20 TABLET, FILM COATED ORAL at 22:39

## 2024-12-21 RX ADMIN — LIDOCAINE 20 MILLILITER(S): 40 CREAM TOPICAL at 22:39

## 2024-12-21 NOTE — ED ADULT NURSE NOTE - HISTORY OF COVID-19 VACCINATION
----- Message from Ino Seo MD sent at 12/12/2023 10:04 AM CST -----  Persistent Colitis confirmed on biopsies  Patient needs to contact PCP for hepatitis A and B vaccination  TB QuantiFERON negative, TPMT normal.  Low risk of side effects from azathioprine  Please call in azathioprine 50 mg daily, patient to submit CBC and CMP in 2 weeks and follow-up with me in clinic  
2nd  to reach pt, no answer, lvm to give office a c/b to justine appt per notes below.   
Attempted to reach pt, no answer, lvm to give office a c/b to justine appt per notes below.   
Lab work 2 weeks after starting medication.  Follow-up with Dr. Seo in clinic in 1-2 months.  
Pt was informed of recommendations for f/u.  Future lab work placed  PSR's please schedule pt follow up OV in 1 to 2 months with Dr. Seo.  Routed to Reception Pool.  
Pt was informed of results and recommendations for f/u.  Rx was sent to pt preferred Pharmacy.  Pt stated that he thought he was not even come back to see the doctor soon, pt would like to make sure if he has to set up appointment in 2 weeks?  Routed to MD.  
Vaccine status unknown

## 2024-12-21 NOTE — ED PROVIDER NOTE - PHYSICAL EXAMINATION
Kamaljit PAYNE:  VITALS: Initial triage and subsequent vitals have been reviewed by me.  GEN APPEARANCE: Alert, cooperative. Non-toxic appearing. Well appearing. NAD.  HEAD: Atraumatic, normocephalic   EYES: PERRLa, EOMI, vision grossly intact.   EARS: Gross hearing intact.   NOSE: No nasal discharge, no external evidence of epistaxis.   THROAT: MMM. Oral cavity and pharynx normal. No inflammation, no swelling, no exudate, no oral lesions.  NECK: Supple  CV: RRR, S1S2, no c/r/m/g. No cyanosis. Extremities warm, well perfused. Cap refill <2 seconds. No bruits.   LUNGS: CTAB. No wheezing. No rales. No rhonchi. No diminished breath sounds.   ABDOMEN: Soft, NTND. No guarding or rebound. No masses.   MSK/EXT: Spine appears normal, no spine point tenderness. No CVA ttp. Normal muscular development. Pelvis stable. No obvious joint or bony deformity, no peripheral edema.   NEURO: Alert, follows commands. Weight bearing normal. Speech normal. Sensation and motor normal x4 extremities. CN2-12 normal, coordination normal, ambulating normally. UE & LE 5/5 b/l.  SKIN: Normal color for race, warm, dry and intact. No evidence of rash.  PSYCH: Normal mood and affect.

## 2024-12-21 NOTE — ED PROVIDER NOTE - PATIENT PORTAL LINK FT
You can access the FollowMyHealth Patient Portal offered by [facility] by registering at the following website: [email or web address]. By joining [employer]’s FollowMyHealth portal, you will also be able to view your health information using other applications (apps) compatible with our system.

## 2024-12-21 NOTE — ED ADULT TRIAGE NOTE - CHIEF COMPLAINT QUOTE
C/o vomiting x 1 hour ago. States having "a lot of gas and burping" with sore throat. Denies abdominal pain, fevers, SOB, chest pain. Denies past medical history. No sick contacts.

## 2024-12-21 NOTE — ED ADULT NURSE NOTE - OBJECTIVE STATEMENT
Patient received in intake room 7. Patient is AOx4, ambulatory, able to speak in full sentences, c/o epigastric pain and vomiting. Patient denies any past medical history. Patient states he has been vomiting for the past hour with associated epigastric pain. Patient endorsed blood streak in vomit. Denies dizziness or weakness. Patient denies recent sick contact. Patient states "I have a lot of gas and am burping a lot". Patient also states "I get this weird burning sensation right in the middle of my chest". EKG in chart. Patient medicated per chart. Denies chest pain, SOB, fever or chills. Denies alcohol use. Patient appears comfortable on stretcher in no signs of acute distress. Comfort measures maintained. Bed in lowest position. Safety maintained.

## 2024-12-21 NOTE — ED PROVIDER NOTE - OBJECTIVE STATEMENT
Kamaljit PAYNE:   29-year-old male, history of gastritis, presents with a chief complaint of nausea vomiting x 1 associated with blood speckled vomitus earlier today, patient also having some mild lightheadedness and dizziness consistent with his vertigo he can move everything and feel everything, no fever has some mild throat discomfort above his manubrium associated with a acid like sensation in his throat and intermittently having some voice changes, denies any changes in stools denies any abdominal pain he can move everything and feel everything no urinary or bowel issues no black or bloody stools today.  Reports is seen a GI in the past was evaluated for H. pylori, had a CT a few months ago for similar episode that was nonactionable,

## 2024-12-21 NOTE — ED ADULT NURSE NOTE - NSFALLUNIVINTERV_ED_ALL_ED
Bed/Stretcher in lowest position, wheels locked, appropriate side rails in place/Call bell, personal items and telephone in reach/Instruct patient to call for assistance before getting out of bed/chair/stretcher/Non-slip footwear applied when patient is off stretcher/[place] to call system/Physically safe environment - no spills, clutter or unnecessary equipment/Purposeful proactive rounding/Room/bathroom lighting operational, light cord in reach

## 2024-12-21 NOTE — ED PROVIDER NOTE - NSFOLLOWUPINSTRUCTIONS_ED_ALL_ED_FT
Thank you for visiting our Emergency Department, it has been a pleasure taking part in your healthcare.  Please read and follow all of your discharge instructions. These instructions contain important information regarding your Emergency Department visit and future medical care.     Your discharge diagnosis is: gastritis  Please take all discharge medications as indicated below:  Please continue all medications as rx'd by your PMD.  Please follow up with your Primary Care Doctor (PMD) within x48 hours.  Bring and show your PMD all documents and results you were given during your ED visit.  If you do not have a primary care doctor please call [phone] to establish primary care.  A copy of resulted labs, imaging, and findings have been provided to you.   You can also access all of your results through the [employer].  If you have questions about your results, please call the Emergency Department.  During your visit and at time of discharge, you had a detailed discussion with your provider regarding your diagnosis, care management and discharge planning.  Topics that were discussed included but were not limited to: return precautions, follow up visits with existing or new providers, new prescriptions and/or medication changes, wound and/or splint/cast care, incidental laboratory/radiology findings, or other care   aspects specific to your diagnosis and treatment. You have been given the opportunity to have your questions answered. At this time you have been deemed stable and fit for discharge.  Return precautions to the Emergency Department include but are not limited to: unrelenting nausea, vomiting, fever, chills, chest pain, shortness of breath, dizziness, chest or abdominal pain, worsening back pain, syncope, blood in urine or stool, headache that doesn't resolve, numbness or tingling, loss of sensation, loss of motor function, or any other concerning symptoms.    Please bring all ED Documents you were given during your stay to your PMD.   They contain important information for you and your PMD, including incidental lab/radiology findings that your PMD should be aware of.  Please follow up with your Gastroenterologist (GI) within x48 hours.  A list of providers for follow up has been given to you.

## 2024-12-21 NOTE — ED PROVIDER NOTE - CLINICAL SUMMARY MEDICAL DECISION MAKING FREE TEXT BOX
Kamaljit PAYNE:   Exam his vitals are stable nontoxic well-appearing with physical exam as above neuroexam is grossly reassuring, abdomen is soft and nontender, chest lungs clear, DDx concern for likely flare of gastritis possibly with early component of Sunni-Parada, I have low concern for more he is is nontoxic well-appearing no distress only 1 episode of vomiting, patient was able to show a picture showing pink stained vomitus, no corey hematemesis, low concern for surgical pathology, low concern for GI bleed is hemodynamic stable young and otherwise healthy, presentation does not appear consistent with that of central vertigo as symptoms are very mild, given his recent vision we will give p.o. GI med screening EKG and chest x-ray p.o. challenge, patient is able to tolerate p.o. anticipate he can likely discharge, will give follow-up for GI.

## 2025-06-02 NOTE — ED ADULT NURSE NOTE - OBJECTIVE STATEMENT
Patient is alert and oriented, vitals are stable Visit Vitals  /69   Pulse 99   Temp 97.5 °F (36.4 °C) (Axillary)   Resp (!) 24   Ht 6' 1\" (1.854 m)   Wt 108.7 kg (239 lb 10.2 oz)   SpO2 92%   BMI 31.62 kg/m²     All due medications given. All due medications given. Patient is resting in bed. Assisted with meals. Dentures not available, mince and moist diet approved by speech evaluator. Patient has chronic muscle tremors. Chronic sacral wound. Wound cleansed and new dressing applied. Patient was weaned down to 6 L nasal canula from Bipap. No productive cough, unable to collect sputum sample. External catheter in place. Needs attended. Free from injury. Call light and room phone within reach.   Problem: At Risk for Falls  Goal: Patient does not fall  Outcome: Monitoring/Evaluating progress  Goal: Patient takes action to control fall-related risks  Outcome: Monitoring/Evaluating progress     Problem: At Risk for Injury Due to Fall  Goal: Patient does not fall  Outcome: Monitoring/Evaluating progress  Goal: Takes action to control condition specific risks  Outcome: Monitoring/Evaluating progress  Goal: Verbalizes understanding of fall-related injury personal risks  Description: Document education using the patient education activity  Outcome: Monitoring/Evaluating progress     Problem: Breathing Pattern Ineffective  Goal: Air exchange is effective, demonstrated by Sp02 sat of greater then or = 92% (or as ordered)  Outcome: Monitoring/Evaluating progress  Goal: Respiratory pattern is quiet and regular without report of SOB  Outcome: Monitoring/Evaluating progress  Goal: Breathing pattern demonstrates minimal apnea during sleep with appropriate use of airway pressure support devices  Outcome: Monitoring/Evaluating progress  Goal: Verbalizes/demonstrates effective breathing management strategies  Description: Document education using the patient education activity.   Outcome: Monitoring/Evaluating progress      Pt arrives to ED A&Ox4, ambulatory at baseline. Pt C/O lower abdominal pain and rectal bleeding x 3 months. Pt seen by PMD had CT scans done with no findings and was recommended to have a colonoscopy. Pt states bleeding is bright red, Denies blood cloths. Denies CP, HA, N, V, dizziness, SOB, fevers, chills. Respirations are even and unlabored, abdomen is soft and nondistended, capillary refill is 2 seconds bilaterally. 20G IV placed in left AC, labs drawn and sent. Medicated as per EMAR. Bed in lowest position, safety maintained.